# Patient Record
Sex: FEMALE | Race: WHITE
[De-identification: names, ages, dates, MRNs, and addresses within clinical notes are randomized per-mention and may not be internally consistent; named-entity substitution may affect disease eponyms.]

---

## 2018-06-28 ENCOUNTER — HOSPITAL ENCOUNTER (OUTPATIENT)
Dept: HOSPITAL 96 - M.CL | Age: 73
Setting detail: OBSERVATION
LOS: 1 days | Discharge: HOME | End: 2018-06-29
Attending: INTERNAL MEDICINE | Admitting: INTERNAL MEDICINE
Payer: COMMERCIAL

## 2018-06-28 VITALS — BODY MASS INDEX: 39.61 KG/M2 | WEIGHT: 232 LBS | HEIGHT: 64.02 IN

## 2018-06-28 VITALS — DIASTOLIC BLOOD PRESSURE: 64 MMHG | SYSTOLIC BLOOD PRESSURE: 156 MMHG

## 2018-06-28 VITALS — SYSTOLIC BLOOD PRESSURE: 146 MMHG | DIASTOLIC BLOOD PRESSURE: 59 MMHG

## 2018-06-28 VITALS — SYSTOLIC BLOOD PRESSURE: 154 MMHG | DIASTOLIC BLOOD PRESSURE: 62 MMHG

## 2018-06-28 VITALS — SYSTOLIC BLOOD PRESSURE: 160 MMHG | DIASTOLIC BLOOD PRESSURE: 70 MMHG

## 2018-06-28 VITALS — DIASTOLIC BLOOD PRESSURE: 45 MMHG | SYSTOLIC BLOOD PRESSURE: 138 MMHG

## 2018-06-28 VITALS — SYSTOLIC BLOOD PRESSURE: 164 MMHG | DIASTOLIC BLOOD PRESSURE: 73 MMHG

## 2018-06-28 VITALS — DIASTOLIC BLOOD PRESSURE: 73 MMHG | SYSTOLIC BLOOD PRESSURE: 164 MMHG

## 2018-06-28 VITALS — DIASTOLIC BLOOD PRESSURE: 60 MMHG | SYSTOLIC BLOOD PRESSURE: 139 MMHG

## 2018-06-28 VITALS — DIASTOLIC BLOOD PRESSURE: 58 MMHG | SYSTOLIC BLOOD PRESSURE: 148 MMHG

## 2018-06-28 VITALS — SYSTOLIC BLOOD PRESSURE: 143 MMHG | DIASTOLIC BLOOD PRESSURE: 52 MMHG

## 2018-06-28 VITALS — SYSTOLIC BLOOD PRESSURE: 158 MMHG | DIASTOLIC BLOOD PRESSURE: 73 MMHG

## 2018-06-28 VITALS — SYSTOLIC BLOOD PRESSURE: 146 MMHG | DIASTOLIC BLOOD PRESSURE: 65 MMHG

## 2018-06-28 VITALS — DIASTOLIC BLOOD PRESSURE: 77 MMHG | SYSTOLIC BLOOD PRESSURE: 168 MMHG

## 2018-06-28 VITALS — SYSTOLIC BLOOD PRESSURE: 143 MMHG | DIASTOLIC BLOOD PRESSURE: 65 MMHG

## 2018-06-28 VITALS — DIASTOLIC BLOOD PRESSURE: 56 MMHG | SYSTOLIC BLOOD PRESSURE: 140 MMHG

## 2018-06-28 DIAGNOSIS — Z98.890: ICD-10-CM

## 2018-06-28 DIAGNOSIS — Z90.89: ICD-10-CM

## 2018-06-28 DIAGNOSIS — I25.10: Primary | ICD-10-CM

## 2018-06-28 DIAGNOSIS — I35.1: ICD-10-CM

## 2018-06-28 DIAGNOSIS — I10: ICD-10-CM

## 2018-06-28 DIAGNOSIS — I05.0: ICD-10-CM

## 2018-06-28 DIAGNOSIS — I51.7: ICD-10-CM

## 2018-06-28 DIAGNOSIS — I35.0: ICD-10-CM

## 2018-06-28 DIAGNOSIS — E78.5: ICD-10-CM

## 2018-06-28 LAB
ALBUMIN SERPL-MCNC: 3.5 G/DL (ref 3.4–5)
ALP SERPL-CCNC: 63 U/L (ref 46–116)
ALT SERPL-CCNC: 46 U/L (ref 30–65)
ANION GAP SERPL CALC-SCNC: 9 MMOL/L (ref 7–16)
APTT BLD: 22.7 SECONDS (ref 25–31.3)
AST SERPL-CCNC: 28 U/L (ref 15–37)
BILIRUB SERPL-MCNC: 0.5 MG/DL
BUN SERPL-MCNC: 27 MG/DL (ref 7–18)
CALCIUM SERPL-MCNC: 9 MG/DL (ref 8.5–10.1)
CHLORIDE SERPL-SCNC: 107 MMOL/L (ref 98–107)
CHOLEST SERPL-MCNC: 206 MG/DL (ref ?–200)
CO2 SERPL-SCNC: 25 MMOL/L (ref 21–32)
CREAT SERPL-MCNC: 1.1 MG/DL (ref 0.6–1.3)
GLUCOSE SERPL-MCNC: 119 MG/DL (ref 70–99)
HCT VFR BLD CALC: 37.4 % (ref 37–47)
HDLC SERPL-MCNC: 42 MG/DL (ref 40–?)
HGB BLD-MCNC: 12.6 GM/DL (ref 12–15)
INR PPP: 1.1
LDLC SERPL-MCNC: 146 MG/DL (ref ?–100)
MCH RBC QN AUTO: 32.8 PG (ref 26–34)
MCHC RBC AUTO-ENTMCNC: 33.5 G/DL (ref 28–37)
MCV RBC: 97.9 FL (ref 80–100)
MPV: 8.2 FL. (ref 7.2–11.1)
PLATELET COUNT*: 222 THOU/UL (ref 150–400)
POTASSIUM SERPL-SCNC: 4.3 MMOL/L (ref 3.5–5.1)
PROT SERPL-MCNC: 6.9 G/DL (ref 6.4–8.2)
PROTHROMBIN TIME: 10.3 SECONDS (ref 9.2–11.5)
RBC # BLD AUTO: 3.83 MIL/UL (ref 4.2–5)
RDW-CV: 13.7 % (ref 10.5–14.5)
SODIUM SERPL-SCNC: 141 MMOL/L (ref 136–145)
TC:HDL: 4.9 RATIO
TRIGL SERPL-MCNC: 93 MG/DL (ref ?–150)
VLDLC SERPL CALC-MCNC: 19 MG/DL (ref ?–40)
WBC # BLD AUTO: 4.6 THOU/UL (ref 4–11)

## 2018-06-28 NOTE — EKG
[place] [address]
Phone:  [phone]                     ELECTROCARDIOGRAM REPORT      
_______________________________________________________________________________
 
Name:       NAIMA ARIZMENDI             Room:                      REG CLI 
M.R.#:  [id number]      Account #:      [id number]  
Admission:  18     Attend Phys:    Jules Carreno MD
Discharge:               Date of Birth:  [birth year]  
         Report #: [id number]
    [id number]
_______________________________________________________________________________
THIS REPORT FOR:  //name//                      
 
                          [facility]
                                       
Test Date:    2018               Test Time:    09:46:33
Pat Name:     NAIMA ARIZMENDI         Department:   
Patient ID:   [id number]            Room:          
Gender:       F                        Technician:   
:          1945               Requested By: Jules Carreno
Order Number: [id number]    Reading MD:   Jules Carreno
                                 Measurements
Intervals                              Axis          
Rate:         70                       P:            74
MD:           193                      QRS:          22
QRSD:         102                      T:            -19
QT:           409                                    
QTc:          442                                    
                           Interpretive Statements
Sinus rhythm
Low voltage, precordial leads
Borderline repolarization abnormality
No previous ECG available for comparison
 
Electronically Signed On 2018 11:33:09 CDT by Jules Carreno
[email or web address]
 
 
 
 
 
 
 
 
 
 
 
 
 
 
 
 
 
 
  <ELECTRONICALLY SIGNED>
                                           By: Jules Carreno MD, [facility]   
  18     1133
D: 1846   _____________________________________
T: 1846   Jules Carreno MD, [facility]     /EPI

## 2018-06-28 NOTE — NUR
PT UP FROM CATH LAB C/O PAIN ACHING RIGHT KNEE REPLACEMENT AND NEEDS THE OTHER
DONE IV CONT 125/H NS UP SBA SLIGHTLY UNSTEADY  AT BEDSIDE TREMORS TO
BILAT HANDS MORE ON THE RIGHT THAN LEFT CALL LIGHT IN REACH TRANSPARENT
DRESSING PUT ON RIGHT RADIAL SITE SMALL HEMATOMA PURPLE IN COLOR UNDERNEATH

## 2018-06-28 NOTE — CARD
[address]  [address]                    CARDIAC CATH REPORT           
_______________________________________________________________________________
 
Name:       NAIMA ARIZMENDI             Room:           [address] [place]  
[facility]#:  [id number]      Account #:      [id number]  
Admission:  06/28/18     Attend Phys:    Jules Carreno MD
Discharge:               Date of Birth:  [birth year]  
         Report #: [id number]
                                                                     [id number]
_______________________________________________________________________________
THIS REPORT FOR:  //name//                      
 
 
--------------- APPROVED REPORT --------------
 
 
Study performed:  06/28/2018 08:23:17
 
Patient Details
Patient Status: Out-Patient                  Room #: 222
The patient is a 73 year-old female
 
Event Personnel
Jules Carreno  Cardiologist, Emily Thompson RN Circulator, 
Roberth Sellers (R) Monitor, Naida Dixon  Scrub, Flory Cooper RTR Scrub, Rodolfo Gallo  Interventional 
Cardiologist
 
Procedures Performed
PTCA Single Vessel LAD
 
Indication
Dyspnea
 
Risk Factors
Hypercholesterolemia, Hypertension
 
Admission/Lab Medications/Medications given during procedure
Aspirin, Glycoprotein IllbIlla Inhibitors, Heparin 
Unfract.
 
Procedure Narrative
The patient was brought electively to the Cardiac Catheterization 
Laboratory and was prepped and draped in a sterile manner. The right 
wrist was infiltrated with 1% Lidocaine subcutaneous anesthesia. A 
Slender Glidesheath sheath was inserted into the right radial artery. 
Coronary angiography was performed using coronary diagnostic 
catheters. The right coronary system was accessed and visualized with 
a Diagnostic 3DRC 5fr catheter. The left coronary system was accessed 
and visualized with a Diagnostic DCR: Tiger 4.0 5fr catheter. The 
left ventricle was accessed and visualized with a Diagnostic PC: 
Angled Pig 5fr catheter. Left ventricular/Aortic Valve gradient 
assessed via catheter pullback. Left ventriculogram was performed in 
STYLES projection. Closure device was deployed with a 6 Fr Vasc-Band Lng 
27cm. The patient tolerated the procedure well and there were no 
complications associated with the procedure. There was no 
 
 
 
[place] [address]                    CARDIAC CATH REPORT           
_______________________________________________________________________________
 
Name:       NAIMA ARIZMENDI             Room:           [address] [place]  
..#:  [id number]      Account #:      [id number]  
Admission:  06/28/18     Attend Phys:    Jules Carreno MD
Discharge:               Date of Birth:  [birth year]  
         Report #: [id number]
                                                                     [id number]
_______________________________________________________________________________
hematoma.
 
Intraoperative Conscious Sedation
Sedation start time:  10:24           Case end Time:  
12:29    
Fentanyl  75 mcg    Versed  5 mg  
 
Fluoro Time:    27.0 minutes     
Dose:     DAP 504700 cGycm2  3997 mGy  
Contrast Type and Amount:  Omnipaque 345 ml    
 
Coronary Angiography
The patient's coronary anatomy is right dominant. 
 
Diagnostic Cath
Left Main normal
LAD  prox. 80-90%, heavily calicifed eccentric stenosis, mid 70% x 2, 
the distal portion involves ositium of diagonal #2, distal LAD mild 
disease,&lt;30%
Diagonal 1 large , normal
Diagonal 2 medium to large, normal
Circumflex mild proximal disease, mid body is normal , large vessel 
overall
OM1  ostial 40%
OM2  very tortuous, moderate distal disease 50% diffusely
Right Coronary occluded mid body
R PDA  larger vessel , seen with left to right collaterals
 
Left Ventriculography
The left ventricle is normal in size with normal contractility. The 
left ventricular ejection fraction is estimated to be 60-65%. severe 
mitral annular calcification mild aortic stenosis, peak to peak 
gradient &lt;15mmHg
 
Hemodynamics
The aortic pressure is 106/55 mmHg with a mean of 73 mmHg. The left 
ventricular pressure is 128/3 mmHg with a mean of mmHg. The left 
ventricular end diastolic pressure is 14 mmHg. Pullback from the left 
ventricle to the aorta revealed a 15 mm gradient across the aortic 
valve.
 
PCI Technique Lesion
Anticoagulation was achieved with Heparin. iv aggrastat given bolus 
only Patient was preloaded with Plavix. Percutaneous coronary 
intervention was performed on the mid left anterior descending artery 
segment. The lesion stenosis prior to intervention was 70%% with FERNANDO 
 
 
 
[place] [address]                    CARDIAC CATH REPORT           
_______________________________________________________________________________
 
Name:       NAIMA ARIZMENDI             Room:           [address] [place]  
[facility]#:  [id number]      Account #:      [id number]  
Admission:  06/28/18     Attend Phys:    Jules Carreno MD
Discharge:               Date of Birth:  [birth year]  
         Report #: [id number]
                                                                     [id number]
_______________________________________________________________________________
3 flow. A 6FR XB 3.5 100CM Guide Catheter was used to engage the lm 
ostium. A IG: BMW 190cm Interventional Guidewire was used to cross 
the lesion.
 
BALLOON DILATION
A Balloon catheter Trek RX 2.5 X 8 was inserted and inflated up to 
8atm for 18seconds. Repeat angiography revealed the following 
post-dilatation results: 0% stenosis.
 
Final angiography reveals 0 % stenosis with FERNANDO 3 flow.
 
PCI Technique Lesion 2
Percutaneous Coronary Intervention was performed on the left anterior 
descending artery segment. Patient was preloaded with Plavix. 
Percutaneous coronary intervention was performed on the mid lad. The 
lesion stenosis prior to intervention was 80% with FERNANDO 3 flow. A 6FR 
XB 3.5 100CM Guide Catheter was used to engage the lm ostium. A IG: 
BMW 190cm Interventional Guidewire was used to cross the 
lesion.
 
Balloon Dilation
A Balloon catheter Trek RX 2.5 X 8 was inserted and inflated up to 
16atm for 12seconds. Repeat angiography revealed the following 
post-dilatation results: 70% stenosis.
 
Final angiography reveals 70 % stenosis with FERNANDO 3 
flow.
 
Comments
Unable to advance bare metal stent to area of stenosis despite deep 
throat of guide, amaris wire, and predilatation.  Difficulty appeared 
to be secondary to tortuosity and calcification.  PTCA only 
performed.  If pt continues to have angina, consider femoral approach 
and guideliner catheter.
 
Conclusion
1. Severe disease involving  mid LAD
2. Occluded RCA , well collateralized, a dominant vessel
3. Mild Aortic stenosis
4. Known Moderate Mitral stenosis
5. Normal LV systolic function
6.  PTCA only of 2 lesions in lad, since a stent could not be 
advanced to area of stenosis 
 
Recommendations
Aggressive Medical Therapy
 
 
 
[place] [address]                    CARDIAC CATH REPORT           
_______________________________________________________________________________
 
Name:       TERRAANA PAULANAIMA SMITH             Room:           [address] [place]  
.R.#:  [id number]      Account #:      [id number]  
Admission:  06/28/18     Attend Phys:    Jules Carreno MD
Discharge:               Date of Birth:  [birth year]  
         Report #: [id number]
                                                                     [id number]
_______________________________________________________________________________
 
Diagnostic Cath Approved by: Jules Carreno MD        Date/Time:
 
 
 
 
 
 
 
 
 
 
 
 
 
 
 
 
 
 
 
 
 
 
 
 
 
 
 
 
 
 
 
 
 
 
 
 
 
 
 
 
 
 
<ELECTRONICALLY SIGNED>
                                        By:  Rodolfo Gallo MD, FACC      
06/28/18     1713
D: 06/28/18 1713_______________________________________
T: 06/28/18 1713Dcarlos alberto Gallo MD, FACC         /INF

## 2018-06-29 VITALS — SYSTOLIC BLOOD PRESSURE: 134 MMHG | DIASTOLIC BLOOD PRESSURE: 77 MMHG

## 2018-06-29 VITALS — SYSTOLIC BLOOD PRESSURE: 142 MMHG | DIASTOLIC BLOOD PRESSURE: 58 MMHG

## 2018-06-29 VITALS — SYSTOLIC BLOOD PRESSURE: 164 MMHG | DIASTOLIC BLOOD PRESSURE: 73 MMHG

## 2018-06-29 VITALS — DIASTOLIC BLOOD PRESSURE: 73 MMHG | SYSTOLIC BLOOD PRESSURE: 164 MMHG

## 2018-06-29 VITALS — DIASTOLIC BLOOD PRESSURE: 37 MMHG | SYSTOLIC BLOOD PRESSURE: 127 MMHG

## 2018-06-29 LAB
ANION GAP SERPL CALC-SCNC: 9 MMOL/L (ref 7–16)
BUN SERPL-MCNC: 23 MG/DL (ref 7–18)
CALCIUM SERPL-MCNC: 8.5 MG/DL (ref 8.5–10.1)
CHLORIDE SERPL-SCNC: 107 MMOL/L (ref 98–107)
CO2 SERPL-SCNC: 24 MMOL/L (ref 21–32)
CREAT SERPL-MCNC: 1.1 MG/DL (ref 0.6–1.3)
GLUCOSE SERPL-MCNC: 100 MG/DL (ref 70–99)
HCT VFR BLD CALC: 31.2 % (ref 37–47)
HGB BLD-MCNC: 10.5 GM/DL (ref 12–15)
MCH RBC QN AUTO: 32.8 PG (ref 26–34)
MCHC RBC AUTO-ENTMCNC: 33.7 G/DL (ref 28–37)
MCV RBC: 97.3 FL (ref 80–100)
MPV: 8.5 FL. (ref 7.2–11.1)
PLATELET COUNT*: 187 THOU/UL (ref 150–400)
POTASSIUM SERPL-SCNC: 4.3 MMOL/L (ref 3.5–5.1)
RBC # BLD AUTO: 3.21 MIL/UL (ref 4.2–5)
RDW-CV: 13.8 % (ref 10.5–14.5)
SODIUM SERPL-SCNC: 140 MMOL/L (ref 136–145)
WBC # BLD AUTO: 5 THOU/UL (ref 4–11)

## 2018-06-29 NOTE — EKG
[place] [address]
Phone:  [phone]                     ELECTROCARDIOGRAM REPORT      
_______________________________________________________________________________
 
Name:       NAIMA ARIZMENDI LUIS             Room:           [address]..#:  [id number]      Account #:      [id number]  
Admission:  18     Attend Phys:    Jules Carreno MD
Discharge:               Date of Birth:  [birth year]  
         Report #: [id number]
    [id number]
_______________________________________________________________________________
THIS REPORT FOR:  //name//                      
 
                          [facility]
                                       
Test Date:    2018               Test Time:    08:13:40
Pat Name:     NAIMA ARIZMENDI         Department:   
Patient ID:   [id number]            Room:         [address]
Gender:       F                        Technician:   
:          1945               Requested By: Jules Carreno
Order Number: [id number]    Reading MD:   Rodolfo Gallo
                                 Measurements
Intervals                              Axis          
Rate:         69                       P:            64
AK:           189                      QRS:          39
QRSD:         107                      T:            -21
QT:           416                                    
QTc:          446                                    
                           Interpretive Statements
Sinus rhythm
Low voltage, precordial leads
Borderline repolarization abnormality
Baseline wander in lead(s) II,aVF
Compared to ECG 2018 09:46:33
No significant changes
 
Electronically Signed On 2018 10:49:29 CDT by Rodolfo Gallo
[email or web address]
 
 
 
 
 
 
 
 
 
 
 
 
 
 
 
 
  <ELECTRONICALLY SIGNED>
                                           By: Rodolfo Gallo MD, [facility]      
  18     1049
D: 18   _____________________________________
T: 18   Rodolfo Gallo MD, [facility]        /EPI

## 2018-06-29 NOTE — EKG
[place] [address]
Phone:  [phone]                     ELECTROCARDIOGRAM REPORT      
_______________________________________________________________________________
 
Name:       NAIMA ARIZMENDI             Room:           [address]R.#:  [id number]      Account #:      [id number]  
Admission:  18     Attend Phys:    Jules Carreno MD
Discharge:               Date of Birth:  [birth year]  
         Report #: [id number]
    [id number]
_______________________________________________________________________________
THIS REPORT FOR:  //name//                      
 
                          [facility]
                                       
Test Date:    2018               Test Time:    13:51:13
Pat Name:     NAIMA ARIZMENDI         Department:   
Patient ID:   [id number]            Room:         [address]
Gender:       F                        Technician:   
:          1945               Requested By: Jules Carreno
Order Number: [id number]    Reading MD:   Rodolfo Gallo
                                 Measurements
Intervals                              Axis          
Rate:         63                       P:            57
ME:           196                      QRS:          41
QRSD:         102                      T:            -32
QT:           433                                    
QTc:          444                                    
                           Interpretive Statements
Sinus rhythm
Low voltage, precordial leads
Borderline repolarization abnormality
Compared to ECG 2018 09:46:33
No significant changes
 
Electronically Signed On 2018 10:42:25 CDT by Rodolfo Gallo
[email or web address]
 
 
 
 
 
 
 
 
 
 
 
 
 
 
 
 
 
  <ELECTRONICALLY SIGNED>
                                           By: Rodolfo Gallo MD, FACC      
  18     1042
D: 18 1351   _____________________________________
T: 18 1351   Rodolfo Gallo MD, [facility]        /EPI

## 2018-06-29 NOTE — NUR
ASSUMED CARE AROUND 1930. PT A/OX4, [place]. TELE MONITOR TRACING SR. VSS.
ONLY PAIN REPORTED WAS H/A LAST NIGHT, PRN APAP GIVEN. ON ROOM AIR. IV SALINE
LOCKED AT THIS TIME. RIGHT RADIAL SITE DRSG C/D/I, INSTRUCTED TO BE CAUTIOUS
WITH THAT HAND/WRIST. BRUISING NOTED AROUND, BUT SOFT. SEE CHARTING. CALL
LIGHT IN REACH, WILL CONTINUE WITH PLAN OF CARE.

## 2018-06-29 NOTE — NUR
ASSESSMENT COMPLETED REFER TO COMPUTER CHARTING. CARDIAC MONITOR TRACKING SR.
PATIENT REPORTING NO PAIN, NAUSEA OR SHORTNESS OF BREATH. BED IN LOW AND
LOCKED POSITION. CALL LIGHT WITHIN REACH. IV SALINE LOCKED AND ON ROOM AIR.
 
DISCHARGE ORDERS RECIEVED. DISCHARGE INSTRUCTIONS GIVEN TO PATIENT AND SPOUSE.
REPORTING NO QUESTIONS OR CONCERNS AT THIS TIME. IV AND CARDIAC MONITOR
DISCONTINUED AND REMOVED. ALL PERSONAL BELONGINGS SENT WITH PATIENT. PATIENT
TAKEN TO THE FRONT DOORS VIA WHEEL CHAIR BY NURSING STAFF.

## 2018-07-01 NOTE — D
[address]  [address]                    DISCHARGE SUMMARY             
_______________________________________________________________________________
 
Name:       NAIMA ARIZMENDI             Room:           [address] Juan Manuel VARGHESE#:  [id number]      Account #:      [id number]  
Admission:  06/28/18     Attend Phys:    Jules Carreno MD
Discharge:  06/29/18     Date of Birth:  [birth year]  
         Report #: [id number]
                                                                     [id number]  
_______________________________________________________________________________
THIS REPORT FOR:  //name//                      
 
CC: RAMAKRISHNA COKER DO
    Ramakrishna Carreno
 
DATE OF SERVICE:  06/29/2018
 
 
DISCHARGE DIAGNOSES:
1.  Coronary artery disease.
2.  Aortic stenosis.
3.  Mitral stenosis.
4.  Hypertension.
5.  Hyperlipidemia.
 
CONSULTANTS:  None.
 
PROCEDURES:  Left heart catheterization with percutaneous transluminal coronary
angioplasty of the left anterior descending artery via the radial approach.
 
HISTORY OF PRESENT ILLNESS:  The patient is a 73-year-old  white female
who was brought to the outpatient department to undergo a cardiac
catheterization.  The patient has no history of heart disease.  Recently, she
complained of exertional dyspnea and her ECG was noted to be abnormal.  She was
referred to my partner, Dr. Carreno.  Because of her risk factors, he
recommended a stress test.  She was also noted to have a heart murmur, so we
recommended echocardiogram.  The echocardiogram was done on 06/18/2018, it
showed a normal ejection fraction, left atrial enlargement, evidence of mild
aortic stenosis, moderate aortic insufficiency.  There is evidence of at least
mild mitral stenosis with left ventricular hypertrophy.  She also underwent a
Cardiolite stress test that showed an anterior defect at rest that worsened with
vasodilator stress.  This was consistent with ischemia.  He therefore
recommended a cardiac catheterization.  The patient denies history of chest
pain.  She does have occasional racing of her heart, but she has had no syncope.
 
PAST MEDICAL HISTORY:  Significant for previous cholecystectomy, hysterectomy,
knee replacement, tonsillectomy, hypertension, and hyperlipidemia.  She
complains that she hurts all over and was seen by rheumatologist who felt she
had fibromyalgia syndrome.
 
MEDICATIONS:  Her current medications include allopurinol, Flexeril, Lopid,
losartan, metoprolol, spironolactone, Desyrel.  In the past, she apparently was
on a statin drug, but it was discontinued because of her complaint of muscle
aches. 
 
 
 
 
[place] [address]                    DISCHARGE SUMMARY             
_______________________________________________________________________________
 
Name:       NAIMA ARIZMENDI             [address]           [address]  [id number]      Account #:      [id number]  
Admission:  06/28/18     Attend Phys:    Jules Carreno MD
Discharge:  06/29/18     Date of Birth:  [birth year]  
         Report #: [id number]
                                                                     [id number]  
_______________________________________________________________________________
ALLERGIES:  SHE ALSO HAS AN ALLERGY TO MORPHINE AND PENICILLIN.
 
PHYSICAL EXAMINATION:
GENERAL:  Revealed an elderly female.
VITAL SIGNS:  Blood pressure 130/80, pulse is 66.
CHEST:  Clear to auscultation.
CARDIOVASCULAR:  Regular rate and rhythm, grade 3 systolic ejection murmur.
ABDOMEN:  Soft.
EXTREMITIES:  No edema.
SKIN:  Warm and dry.
 
RADIOLOGICAL DATA:  Her ECG showed a sinus rhythm with nonspecific ST and T-wave
change.
 
LABORATORY DATA:  Revealed sodium of 140, BUN 23, creatinine 1.1.  Her
cholesterol was 206, triglyceride 93, HDL 42, .  White blood cell count
5.0 and hemoglobin 12.6.
 
HOSPITAL COURSE:  The patient was brought to the outpatient department.  Dr. Carreno performed cardiac catheterization from the right radial artery. 
Results showed severe coronary artery disease.  She had a chronic occlusion of
the right coronary artery that was filled by collaterals.  The left anterior
descending artery had what appeared to be a very eccentric 90% stenosis just
after the first diagonal branch.  Prior to the second diagonal branch, there was
a 70% tubular narrowing.  After the small second diagonal branch, there was
another 70% tubular stenosis.  The circumflex had no high-grade disease.  The
plan was to perform stenting of the LAD even though she had no history of
angina, but she did complain of exertional dyspnea and had an abnormal
Cardiolite.  The patient was given heparin and Aggrastat.  I then performed
balloon angioplasty of the 90% LAD after the first diagonal branch and the
second 70% stenosis.  Unfortunately, because of the calcification and
tortuosity, I was unable to place stents.  This was despite using a buddy wire
and deep throating of the guiding catheter.  The patient tolerated the procedure
well.  Despite efforts, it was decided to abandon further efforts at placing a
stent.  I attempted placing a bare metal stent.  Unfortunately, the patient
tolerated the procedure well.  She was loaded with Plavix.  ____ band was placed
over the right radial artery.  The following day, she was ambulating, had no
further complaints.  She had some ecchymosis of the right wrist, but there was
no significant hematoma.  She has had no chest pain, shortness of breath,
arrhythmias.  She was discharged to continue her home medications that include
allopurinol 100 mg a day, Flexeril 10 mg a day, Lopid 600 mg twice a day,
losartan 100 mg a day, metoprolol XL 25 mg a day, spironolactone 25 mg a day,
Desyrel 50 mg a day.  In addition, she was started on aspirin 81 mg a day and
Plavix 75 mg a day following balloon angioplasty.  To lower her LDL, she was
started on Lipitor 40 mg a day.  She was to contact my office if she had
increasing joint aches in her muscles.  She was given Plavix 75 mg a day to take
 
 
 
[place] [address]                    DISCHARGE SUMMARY             
_______________________________________________________________________________
 
Name:       NAIMA ARIZMENDI             Room:           [address] Juan Manuel VARGHESE#:  [id number]      Account #:      [id number]  
Admission:  06/28/18     Attend Phys:    Jules Carreno MD
Discharge:  06/29/18     Date of Birth:  [birth year]  
         Report #: [id number]
                                                                     [id number]  
_______________________________________________________________________________
orally following balloon angioplasty for coronary artery disease and she was
given nitroglycerin to take as needed for chest pain.  She was scheduled to
return to see my nurse practitioner in 1 week for inspection of her right wrist.
 She plans to see Dr. Carreno in 1 month.  Most likely, he will proceed with
MILENA to assess her mitral valve stenosis.  If she begins to have angina or
continues to have dyspnea, I would attempt stenting of the LAD, perhaps using
the femoral approach for additional support, atherectomy or perhaps a
Guideliner.  Her prognosis is guarded due to her diffuse coronary artery
disease.  She was discharged to return to the care of Dr. Bradley for routine
medical care.  I did recommend she enroll in cardiac rehabilitation.  At the
time of discharge, she was ambulating, had no further complaints.  Additional
lab work after her procedure included hemoglobin 10.5 and there was no drop in
hemoglobin or ECG changes.  At the time of discharge, she had a blood pressure
of 140/70 with pulse 60.
 
 
 
 
 
 
 
 
 
 
 
 
 
 
 
 
 
 
 
 
 
 
 
 
 
 
 
 
 
 
<ELECTRONICALLY SIGNED>
                                        By:  Rodolfo Gallo MD, FACC      
07/01/18     1819
D: 06/29/18 1129_______________________________________
T: 06/29/18 1222Davijuan Gallo MD, FACC         /nt

## 2018-07-19 ENCOUNTER — HOSPITAL ENCOUNTER (OUTPATIENT)
Dept: HOSPITAL 96 - M.CL | Age: 73
Discharge: HOME | End: 2018-07-19
Attending: INTERNAL MEDICINE
Payer: COMMERCIAL

## 2018-07-19 VITALS — SYSTOLIC BLOOD PRESSURE: 150 MMHG | DIASTOLIC BLOOD PRESSURE: 74 MMHG

## 2018-07-19 VITALS — DIASTOLIC BLOOD PRESSURE: 40 MMHG | SYSTOLIC BLOOD PRESSURE: 138 MMHG

## 2018-07-19 VITALS — SYSTOLIC BLOOD PRESSURE: 139 MMHG | DIASTOLIC BLOOD PRESSURE: 52 MMHG

## 2018-07-19 VITALS — DIASTOLIC BLOOD PRESSURE: 40 MMHG | SYSTOLIC BLOOD PRESSURE: 119 MMHG

## 2018-07-19 VITALS — SYSTOLIC BLOOD PRESSURE: 135 MMHG | DIASTOLIC BLOOD PRESSURE: 41 MMHG

## 2018-07-19 VITALS — DIASTOLIC BLOOD PRESSURE: 90 MMHG | SYSTOLIC BLOOD PRESSURE: 158 MMHG

## 2018-07-19 VITALS — DIASTOLIC BLOOD PRESSURE: 79 MMHG | SYSTOLIC BLOOD PRESSURE: 159 MMHG

## 2018-07-19 VITALS — DIASTOLIC BLOOD PRESSURE: 48 MMHG | SYSTOLIC BLOOD PRESSURE: 139 MMHG

## 2018-07-19 VITALS — DIASTOLIC BLOOD PRESSURE: 43 MMHG | SYSTOLIC BLOOD PRESSURE: 112 MMHG

## 2018-07-19 VITALS — SYSTOLIC BLOOD PRESSURE: 173 MMHG | DIASTOLIC BLOOD PRESSURE: 84 MMHG

## 2018-07-19 VITALS — SYSTOLIC BLOOD PRESSURE: 167 MMHG | DIASTOLIC BLOOD PRESSURE: 80 MMHG

## 2018-07-19 VITALS — DIASTOLIC BLOOD PRESSURE: 37 MMHG | SYSTOLIC BLOOD PRESSURE: 160 MMHG

## 2018-07-19 VITALS — DIASTOLIC BLOOD PRESSURE: 50 MMHG | SYSTOLIC BLOOD PRESSURE: 122 MMHG

## 2018-07-19 DIAGNOSIS — Z79.82: ICD-10-CM

## 2018-07-19 DIAGNOSIS — Z79.01: ICD-10-CM

## 2018-07-19 DIAGNOSIS — Z88.0: ICD-10-CM

## 2018-07-19 DIAGNOSIS — I10: ICD-10-CM

## 2018-07-19 DIAGNOSIS — Z79.899: ICD-10-CM

## 2018-07-19 DIAGNOSIS — I08.0: Primary | ICD-10-CM

## 2018-07-19 DIAGNOSIS — Z88.8: ICD-10-CM

## 2018-07-19 NOTE — TEE
[place] [address]
Phone:  [phone]                     TRANSESOPHAGEAL ECHOCARDIOGRAM
_______________________________________________________________________________
 
Name:         KYLEIGHNAIMA SMITH            Room:                     REG CLI
M.R.#:    [id number]     Account #:     [id number]  
Admission:    07/19/18    Attend Phys:   Jules Carreno,
Discharge:                Date of Birth: [birth year]  
Date of Service: 07/19/18 1336  Report #:      [id number]
        [id number]
_______________________________________________________________________________
THIS REPORT FOR:  //name//                      
 
 
--------------- APPROVED REPORT --------------
 
 
Study performed:  07/19/2018 08:47:26
 
EXAM: Transesophageal Echocardiogram 
Patient Location: Out-Patient  
     Status:  routine
 
      BSA:         2.13
HR: 78 bpm BP:          173/84 mmHg 
Rhythm: NSR     
 
Other Information 
Study Quality: Good
 
Indications
Mitral Valve Disease 
 
Echo Enhancing Agent
Indication: Rule out Shunt
Agent(s) / Amount(s) Used: Agitated Saline 10 cc
 
Procedure
After obtaining informed consent, patient underwent transesophageal 
echo in the [facility]. 
Type of Sedation : Conscious Sedation
Sedation was administered by Dorota Alex RN. 
Sedation start time:  0936           Case end Time:  0949
Sedation was achieved intravenously with:  Versed (4)    Fentanyl 
(50)    
Transesophageal probe was inserted and advanced into esophagus 
without difficulty by Jules Carreno MD, FACC.
Echo enhancement indication: R/O Septal defect.
Echo enhancement agent administered: Agitated Saline
The MILENA was performed without complications. 
Throughout the procedure, the blood pressure, pulse oximetry, cardiac 
rhythm, and rate were monitored.
The patient tolerated the procedure without adverse effects. Recovery 
from conscious sedation was uneventful and vital signs were 
stable.
 
Left Ventricle
 
 
[place] [address]
Phone:  [phone]                     TRANSESOPHAGEAL ECHOCARDIOGRAM
_______________________________________________________________________________
 
Name:         NAIMA ARIZMENDI            Room:                     REG CLI
M.R.#:    [id number]     Account #:     [id number]  
Admission:    07/19/18    Attend Phys:   Jules Carreno,
Discharge:                Date of Birth: [birth year]  
Date of Service: 07/19/18 1336  Report #:      [id number]
        [id number]
_______________________________________________________________________________
The left ventricle is normal size. There is normal LV segmental wall 
motion. Mild to moderate concentric left ventricular hypertrophy. 
There is no ventricular septal defect visualized. Left ventricular 
systolic function is normal. The left ventricular ejection fraction 
is within the normal range. No left ventricle thrombus noted on this 
study. LVEF is 65%.
 
Right Ventricle
The right ventricle is normal size. The right ventricular systolic 
function is normal.
 
Atria
Left atrium is moderately dilated. No thrombus is visualized in the 
left atrium or appendage. Interatrial septum is intact without 
evidence of ASD or PFO. The right atrium size is normal.
 
Aortic Valve
Mild aortic valve sclerosis. Mild aortic regurgitation. Mild aortic 
stenosis.Planimeter area 1.86cm2
 
Mitral Valve
Severe calcification of annulus, thickened leaflets. The posterior 
leaflet is redundant. Mild to Moderate mitral regurgitation. Moderate 
mitral stenosis.Mean gradient 8.4mmhg
 
Tricuspid Valve
The tricuspid valve is normal in structure. Moderate tricuspid 
regurgitation.
 
Pulmonic Valve
The pulmonary valve is normal in structure. Trace pulmonic 
regurgitation.
 
Great Vessels
The aortic root is normal in size.
 
Pericardium
There is no pericardial effusion.
 
&lt;Conclusion&gt;
LVEF is 65%.
Mild aortic stenosis.
Mild aortic regurgitation.
Moderate mitral stenosis.Mean gradient 8.4mmhg
Mild to Moderate mitral regurgitation.
There is normal LV segmental wall motion.
 
 
[place] [address]
Phone:  [phone]                     TRANSESOPHAGEAL ECHOCARDIOGRAM
_______________________________________________________________________________
 
Name:         NAIMA ARIZMENDI            Room:                     REG CLI
M.R.#:    [id number]     Account #:     [id number]  
Admission:    07/19/18    Attend Phys:   Jules Carreno,
Discharge:                Date of Birth: [birth year]  
Date of Service: 07/19/18 1336  Report #:      [id number]
        [id number]
_______________________________________________________________________________
Left atrium is moderately dilated.
No thrombus is visualized in the left atrium or 
appendage.
Interatrial septum is intact without evidence of ASD or PFO.
 
 
 
 
 
 
 
 
 
 
 
 
 
 
 
 
 
 
 
 
 
 
 
 
 
 
 
 
 
 
 
 
 
 
 
 
 
 
 
 
  <ELECTRONICALLY SIGNED>
                                           By: Jules Carreno MD, [facility]   
  07/19/18 1336
D: 07/19/18 1336   _____________________________________
T: 07/19/18 1336   Jules Carreno MD, FACC     /INF

## 2018-07-25 ENCOUNTER — HOSPITAL ENCOUNTER (INPATIENT)
Dept: HOSPITAL 96 - M.CL | Age: 73
LOS: 2 days | Discharge: HOME | DRG: 246 | End: 2018-07-27
Attending: INTERNAL MEDICINE | Admitting: INTERNAL MEDICINE
Payer: COMMERCIAL

## 2018-07-25 VITALS — DIASTOLIC BLOOD PRESSURE: 36 MMHG | SYSTOLIC BLOOD PRESSURE: 87 MMHG

## 2018-07-25 VITALS — DIASTOLIC BLOOD PRESSURE: 54 MMHG | SYSTOLIC BLOOD PRESSURE: 139 MMHG

## 2018-07-25 VITALS — SYSTOLIC BLOOD PRESSURE: 92 MMHG | DIASTOLIC BLOOD PRESSURE: 46 MMHG

## 2018-07-25 VITALS — SYSTOLIC BLOOD PRESSURE: 114 MMHG | DIASTOLIC BLOOD PRESSURE: 40 MMHG

## 2018-07-25 VITALS — SYSTOLIC BLOOD PRESSURE: 99 MMHG | DIASTOLIC BLOOD PRESSURE: 40 MMHG

## 2018-07-25 VITALS — SYSTOLIC BLOOD PRESSURE: 89 MMHG | DIASTOLIC BLOOD PRESSURE: 36 MMHG

## 2018-07-25 VITALS — SYSTOLIC BLOOD PRESSURE: 131 MMHG | DIASTOLIC BLOOD PRESSURE: 47 MMHG

## 2018-07-25 VITALS — SYSTOLIC BLOOD PRESSURE: 97 MMHG | DIASTOLIC BLOOD PRESSURE: 37 MMHG

## 2018-07-25 VITALS — SYSTOLIC BLOOD PRESSURE: 115 MMHG | DIASTOLIC BLOOD PRESSURE: 45 MMHG

## 2018-07-25 VITALS — DIASTOLIC BLOOD PRESSURE: 37 MMHG | SYSTOLIC BLOOD PRESSURE: 105 MMHG

## 2018-07-25 VITALS — SYSTOLIC BLOOD PRESSURE: 132 MMHG | DIASTOLIC BLOOD PRESSURE: 54 MMHG

## 2018-07-25 VITALS — DIASTOLIC BLOOD PRESSURE: 48 MMHG | SYSTOLIC BLOOD PRESSURE: 119 MMHG

## 2018-07-25 VITALS — SYSTOLIC BLOOD PRESSURE: 94 MMHG | DIASTOLIC BLOOD PRESSURE: 40 MMHG

## 2018-07-25 VITALS — DIASTOLIC BLOOD PRESSURE: 54 MMHG | SYSTOLIC BLOOD PRESSURE: 112 MMHG

## 2018-07-25 VITALS — SYSTOLIC BLOOD PRESSURE: 119 MMHG | DIASTOLIC BLOOD PRESSURE: 48 MMHG

## 2018-07-25 VITALS — SYSTOLIC BLOOD PRESSURE: 99 MMHG | DIASTOLIC BLOOD PRESSURE: 42 MMHG

## 2018-07-25 VITALS — SYSTOLIC BLOOD PRESSURE: 91 MMHG | DIASTOLIC BLOOD PRESSURE: 45 MMHG

## 2018-07-25 VITALS — WEIGHT: 243 LBS | HEIGHT: 65 IN | BODY MASS INDEX: 40.48 KG/M2

## 2018-07-25 VITALS — SYSTOLIC BLOOD PRESSURE: 112 MMHG | DIASTOLIC BLOOD PRESSURE: 48 MMHG

## 2018-07-25 VITALS — SYSTOLIC BLOOD PRESSURE: 97 MMHG | DIASTOLIC BLOOD PRESSURE: 39 MMHG

## 2018-07-25 VITALS — DIASTOLIC BLOOD PRESSURE: 44 MMHG | SYSTOLIC BLOOD PRESSURE: 100 MMHG

## 2018-07-25 VITALS — DIASTOLIC BLOOD PRESSURE: 43 MMHG | SYSTOLIC BLOOD PRESSURE: 102 MMHG

## 2018-07-25 VITALS — DIASTOLIC BLOOD PRESSURE: 44 MMHG | SYSTOLIC BLOOD PRESSURE: 99 MMHG

## 2018-07-25 VITALS — DIASTOLIC BLOOD PRESSURE: 55 MMHG | SYSTOLIC BLOOD PRESSURE: 130 MMHG

## 2018-07-25 VITALS — DIASTOLIC BLOOD PRESSURE: 36 MMHG | SYSTOLIC BLOOD PRESSURE: 94 MMHG

## 2018-07-25 VITALS — DIASTOLIC BLOOD PRESSURE: 38 MMHG | SYSTOLIC BLOOD PRESSURE: 92 MMHG

## 2018-07-25 VITALS — SYSTOLIC BLOOD PRESSURE: 100 MMHG | DIASTOLIC BLOOD PRESSURE: 46 MMHG

## 2018-07-25 VITALS — DIASTOLIC BLOOD PRESSURE: 45 MMHG | SYSTOLIC BLOOD PRESSURE: 91 MMHG

## 2018-07-25 VITALS — SYSTOLIC BLOOD PRESSURE: 85 MMHG | DIASTOLIC BLOOD PRESSURE: 39 MMHG

## 2018-07-25 VITALS — SYSTOLIC BLOOD PRESSURE: 103 MMHG | DIASTOLIC BLOOD PRESSURE: 42 MMHG

## 2018-07-25 DIAGNOSIS — I50.33: ICD-10-CM

## 2018-07-25 DIAGNOSIS — E78.5: ICD-10-CM

## 2018-07-25 DIAGNOSIS — I25.119: Primary | ICD-10-CM

## 2018-07-25 DIAGNOSIS — I35.1: ICD-10-CM

## 2018-07-25 DIAGNOSIS — I10: ICD-10-CM

## 2018-07-25 LAB
ALBUMIN SERPL-MCNC: 3.8 G/DL (ref 3.4–5)
ALP SERPL-CCNC: 73 U/L (ref 46–116)
ALT SERPL-CCNC: 38 U/L (ref 30–65)
ANION GAP SERPL CALC-SCNC: 12 MMOL/L (ref 7–16)
APTT BLD: 24.3 SECONDS (ref 25–31.3)
AST SERPL-CCNC: 27 U/L (ref 15–37)
BILIRUB SERPL-MCNC: 0.4 MG/DL
BUN SERPL-MCNC: 27 MG/DL (ref 7–18)
CALCIUM SERPL-MCNC: 9.5 MG/DL (ref 8.5–10.1)
CHLORIDE SERPL-SCNC: 103 MMOL/L (ref 98–107)
CO2 SERPL-SCNC: 23 MMOL/L (ref 21–32)
CREAT SERPL-MCNC: 1.2 MG/DL (ref 0.6–1.3)
GLUCOSE SERPL-MCNC: 112 MG/DL (ref 70–99)
HCT VFR BLD CALC: 37.9 % (ref 37–47)
HGB BLD-MCNC: 12.8 GM/DL (ref 12–15)
INR PPP: 1.1
MCH RBC QN AUTO: 32.8 PG (ref 26–34)
MCHC RBC AUTO-ENTMCNC: 33.8 G/DL (ref 28–37)
MCV RBC: 97 FL (ref 80–100)
MPV: 8.1 FL. (ref 7.2–11.1)
PLATELET COUNT*: 227 THOU/UL (ref 150–400)
POTASSIUM SERPL-SCNC: 4.2 MMOL/L (ref 3.5–5.1)
PROT SERPL-MCNC: 7.6 G/DL (ref 6.4–8.2)
PROTHROMBIN TIME: 10.5 SECONDS (ref 9.2–11.5)
RBC # BLD AUTO: 3.91 MIL/UL (ref 4.2–5)
RDW-CV: 13.5 % (ref 10.5–14.5)
SODIUM SERPL-SCNC: 138 MMOL/L (ref 136–145)
WBC # BLD AUTO: 5.1 THOU/UL (ref 4–11)

## 2018-07-25 NOTE — H
[address]  [address]                    HISTORY AND PHYSICAL          
_______________________________________________________________________________
 
Name:       NAIMA ARIZMENDI             Room:           M.201-P    DIS IN  
M..#:  [id number]      Account #:      [id number]  
Admission:  07/25/18     Attend Phys:    Ivan Soto MD, 
Discharge:  07/27/18     Date of Birth:  [birth year]  
         Report #: [id number]
                                                                                
_______________________________________________________________________________
THIS REPORT FOR:  //name//                      
 
Please refer to the History and Physical performed in the physician's office.
 
 
 
 
 
 
 
 
 
 
 
 
 
 
 
 
 
 
 
 
 
 
 
 
 
 
 
 
 
 
 
 
 
 
 
 
 
 
 
 
 
                       
                                        By:                                
                 
D: 07/25/18     _______________________________________
T: 07/31/18 0858Medical Records Staff WILEY       /AL

## 2018-07-25 NOTE — NUR
RECEIVED REPORT FROM CHELITA IN CATH LAB AND ASSUMED CARE OF PT @ 0312.PT IS
A/O,VSS,TRACING SR ON THE MONITOR.LUNG SOUNDS ARE CLEAR.LAST BM WAS
YESTERDAY.IV LEFT AC PATENT WITH NS RUNNING @ 125ML/HR.PT IS CALM AND
COOPERATIVE WITH NO C/O PAIN AT TIME OF ASSESSMENT.PT IS BEDREST UNTIL 2138
PER POST CATH ORDERS.PT LEFT RESTING IN BED WIHT FAMILY AT BEDSIDE.CALL LIGHT
AND FALL PRECAUTIONS IN PLACE.POST CATH VITALS COMPLETED.CATH SITE CLEAN,DRY
AND INTACT.PT INFORMED OF PLAN OF CARE AND COMMUNICATES UNDERSTANDING.HOURLY
ROUNDING COMPLETED FOR PT SAFETY.WILL CONTINUE TO MONITOR FOR DURATION OF
SHIFT.

## 2018-07-25 NOTE — EKG
[place] [address]
Phone:  [phone]                     ELECTROCARDIOGRAM REPORT      
_______________________________________________________________________________
 
Name:       NAIMA ARIZMENDI             Room:           [address] [place]  
[facility].#:  [id number]      Account #:      [id number]  
Admission:  18     Attend Phys:    Ivan Soto MD, 
Discharge:               Date of Birth:  [birth year]  
         Report #: [id number]
    [id number]
_______________________________________________________________________________
THIS REPORT FOR:  //name//                      
 
                          [facility]
                                       
Test Date:    2018               Test Time:    13:33:31
Pat Name:     NAIMA ARIZMENDI         Department:   
Patient ID:   [id number]            Room:          
Gender:       F                        Technician:   
:          1945               Requested By: Ivan Soto
Order Number: [id number]    Reading MD:   Khanh Chao
                                 Measurements
Intervals                              Axis          
Rate:         73                       P:            72
TX:           194                      QRS:          42
QRSD:         101                      T:            -27
QT:           400                                    
QTc:          441                                    
                           Interpretive Statements
Sinus rhythm
Atrial premature complex
Inferior infarct, age indeterminate, possible
 
Compared to ECG 2018 08:13:40
Atrial premature complex(es) now present
Myocardial infarct finding now present
 
Electronically Signed On 2018 18:06:25 CDT by Khanh Chao
[email or web address]
 
 
 
 
 
 
 
 
 
 
 
 
 
 
 
  <ELECTRONICALLY SIGNED>
                                           By: Khanh Chao MD, FACC   
  18     1806
D: 18 1333   _____________________________________
T: 18 1333   Khanh Chao MD, [facility]     /EPI

## 2018-07-25 NOTE — EKG
[place] [address]
Phone:  [phone]                     ELECTROCARDIOGRAM REPORT      
_______________________________________________________________________________
 
Name:       NAIMA ARIZMENDI             Room:           [address] [place]  
.R.#:  [id number]      Account #:      [id number]  
Admission:  18     Attend Phys:    Ivan Soto MD, 
Discharge:               Date of Birth:  [birth year]  
         Report #: [id number]
    [id number]
_______________________________________________________________________________
THIS REPORT FOR:  //name//                      
 
                          [facility]
                                       
Test Date:    2018               Test Time:    16:18:18
Pat Name:     NAIMA ARIZMENDI         Department:   
Patient ID:   [id number]            Room:          
Gender:       F                        Technician:   [facility]
:          1945               Requested By: Ivan Soto
Order Number: [id number]    Reading MD:   Khanh Chao
                                 Measurements
Intervals                              Axis          
Rate:         82                       P:            46
AL:           202                      QRS:          41
QRSD:         100                      T:            -31
QT:           398                                    
QTc:          465                                    
                           Interpretive Statements
Sinus rhythm
Atrial premature complexes
nonspecific ST-T wave changes, consider ischemia
Borderline low voltage, extremity leads
Borderline repolarization abnormality
Compared to ECG 2018 08:13:40
Atrial premature complex(es) now present
 
Electronically Signed On 2018 18:07:11 CDT by Khanh Chao
[email or web address]
 
 
 
 
 
 
 
 
 
 
 
 
 
 
 
  <ELECTRONICALLY SIGNED>
                                           By: Khanh Chao MD, FACC   
  18     1807
D: 18 1618   _____________________________________
T: 18 1618   Khanh Chao MD, [facility]     /EPI

## 2018-07-26 VITALS — SYSTOLIC BLOOD PRESSURE: 93 MMHG | DIASTOLIC BLOOD PRESSURE: 36 MMHG

## 2018-07-26 VITALS — DIASTOLIC BLOOD PRESSURE: 34 MMHG | SYSTOLIC BLOOD PRESSURE: 95 MMHG

## 2018-07-26 VITALS — DIASTOLIC BLOOD PRESSURE: 35 MMHG | SYSTOLIC BLOOD PRESSURE: 71 MMHG

## 2018-07-26 VITALS — SYSTOLIC BLOOD PRESSURE: 86 MMHG | DIASTOLIC BLOOD PRESSURE: 36 MMHG

## 2018-07-26 VITALS — DIASTOLIC BLOOD PRESSURE: 50 MMHG | SYSTOLIC BLOOD PRESSURE: 99 MMHG

## 2018-07-26 VITALS — DIASTOLIC BLOOD PRESSURE: 45 MMHG | SYSTOLIC BLOOD PRESSURE: 122 MMHG

## 2018-07-26 VITALS — SYSTOLIC BLOOD PRESSURE: 114 MMHG | DIASTOLIC BLOOD PRESSURE: 52 MMHG

## 2018-07-26 VITALS — DIASTOLIC BLOOD PRESSURE: 38 MMHG | SYSTOLIC BLOOD PRESSURE: 92 MMHG

## 2018-07-26 VITALS — DIASTOLIC BLOOD PRESSURE: 35 MMHG | SYSTOLIC BLOOD PRESSURE: 99 MMHG

## 2018-07-26 VITALS — DIASTOLIC BLOOD PRESSURE: 37 MMHG | SYSTOLIC BLOOD PRESSURE: 82 MMHG

## 2018-07-26 VITALS — SYSTOLIC BLOOD PRESSURE: 117 MMHG | DIASTOLIC BLOOD PRESSURE: 49 MMHG

## 2018-07-26 VITALS — SYSTOLIC BLOOD PRESSURE: 96 MMHG | DIASTOLIC BLOOD PRESSURE: 39 MMHG

## 2018-07-26 VITALS — SYSTOLIC BLOOD PRESSURE: 84 MMHG | DIASTOLIC BLOOD PRESSURE: 36 MMHG

## 2018-07-26 VITALS — DIASTOLIC BLOOD PRESSURE: 47 MMHG | SYSTOLIC BLOOD PRESSURE: 116 MMHG

## 2018-07-26 VITALS — DIASTOLIC BLOOD PRESSURE: 19 MMHG | SYSTOLIC BLOOD PRESSURE: 75 MMHG

## 2018-07-26 VITALS — DIASTOLIC BLOOD PRESSURE: 39 MMHG | SYSTOLIC BLOOD PRESSURE: 107 MMHG

## 2018-07-26 VITALS — DIASTOLIC BLOOD PRESSURE: 40 MMHG | SYSTOLIC BLOOD PRESSURE: 91 MMHG

## 2018-07-26 VITALS — SYSTOLIC BLOOD PRESSURE: 80 MMHG | DIASTOLIC BLOOD PRESSURE: 36 MMHG

## 2018-07-26 VITALS — DIASTOLIC BLOOD PRESSURE: 41 MMHG | SYSTOLIC BLOOD PRESSURE: 96 MMHG

## 2018-07-26 VITALS — SYSTOLIC BLOOD PRESSURE: 128 MMHG | DIASTOLIC BLOOD PRESSURE: 47 MMHG

## 2018-07-26 VITALS — DIASTOLIC BLOOD PRESSURE: 41 MMHG | SYSTOLIC BLOOD PRESSURE: 112 MMHG

## 2018-07-26 VITALS — DIASTOLIC BLOOD PRESSURE: 44 MMHG | SYSTOLIC BLOOD PRESSURE: 102 MMHG

## 2018-07-26 VITALS — SYSTOLIC BLOOD PRESSURE: 120 MMHG | DIASTOLIC BLOOD PRESSURE: 50 MMHG

## 2018-07-26 VITALS — SYSTOLIC BLOOD PRESSURE: 116 MMHG | DIASTOLIC BLOOD PRESSURE: 47 MMHG

## 2018-07-26 VITALS — DIASTOLIC BLOOD PRESSURE: 34 MMHG | SYSTOLIC BLOOD PRESSURE: 86 MMHG

## 2018-07-26 VITALS — DIASTOLIC BLOOD PRESSURE: 54 MMHG | SYSTOLIC BLOOD PRESSURE: 131 MMHG

## 2018-07-26 VITALS — DIASTOLIC BLOOD PRESSURE: 46 MMHG | SYSTOLIC BLOOD PRESSURE: 121 MMHG

## 2018-07-26 VITALS — SYSTOLIC BLOOD PRESSURE: 122 MMHG | DIASTOLIC BLOOD PRESSURE: 51 MMHG

## 2018-07-26 VITALS — SYSTOLIC BLOOD PRESSURE: 107 MMHG | DIASTOLIC BLOOD PRESSURE: 42 MMHG

## 2018-07-26 VITALS — SYSTOLIC BLOOD PRESSURE: 98 MMHG | DIASTOLIC BLOOD PRESSURE: 40 MMHG

## 2018-07-26 LAB
ALBUMIN SERPL-MCNC: 2.7 G/DL (ref 3.4–5)
ALP SERPL-CCNC: 49 U/L (ref 46–116)
ALT SERPL-CCNC: 26 U/L (ref 30–65)
ANION GAP SERPL CALC-SCNC: 9 MMOL/L (ref 7–16)
AST SERPL-CCNC: 25 U/L (ref 15–37)
BILIRUB SERPL-MCNC: 0.5 MG/DL
BUN SERPL-MCNC: 21 MG/DL (ref 7–18)
CALCIUM SERPL-MCNC: 7.6 MG/DL (ref 8.5–10.1)
CHLORIDE SERPL-SCNC: 110 MMOL/L (ref 98–107)
CHOLEST SERPL-MCNC: 97 MG/DL (ref ?–200)
CO2 SERPL-SCNC: 22 MMOL/L (ref 21–32)
CREAT SERPL-MCNC: 1 MG/DL (ref 0.6–1.3)
GLUCOSE SERPL-MCNC: 135 MG/DL (ref 70–99)
HCT VFR BLD CALC: 27 % (ref 37–47)
HDLC SERPL-MCNC: 33 MG/DL (ref 40–?)
HGB BLD-MCNC: 9.3 GM/DL (ref 12–15)
LDLC SERPL-MCNC: 36 MG/DL (ref ?–100)
MCH RBC QN AUTO: 33.6 PG (ref 26–34)
MCHC RBC AUTO-ENTMCNC: 34.4 G/DL (ref 28–37)
MCV RBC: 97.7 FL (ref 80–100)
MPV: 8.1 FL. (ref 7.2–11.1)
PLATELET COUNT*: 176 THOU/UL (ref 150–400)
POTASSIUM SERPL-SCNC: 4.2 MMOL/L (ref 3.5–5.1)
PROT SERPL-MCNC: 5.2 G/DL (ref 6.4–8.2)
RBC # BLD AUTO: 2.77 MIL/UL (ref 4.2–5)
RDW-CV: 13.5 % (ref 10.5–14.5)
SERUM ASSESSMENT: CLEAR
SODIUM SERPL-SCNC: 141 MMOL/L (ref 136–145)
TC:HDL: 2.9 RATIO
TRIGL SERPL-MCNC: 140 MG/DL (ref ?–150)
TROPONIN-I LEVEL: 1.89 NG/ML (ref ?–0.06)
VLDLC SERPL CALC-MCNC: 28 MG/DL (ref ?–40)
WBC # BLD AUTO: 6.9 THOU/UL (ref 4–11)

## 2018-07-26 NOTE — NUR
TRANSFERRED TO ICU BED 5 AT 1910, DR BECKER AT BEDSIDE.  R GROIN CATH SITE
NOTED TO BRUISED WITH SMALL HEMATOMA LATERAL TO INSERTION SITE, DR BECKER
STATED IT WAS SOMEWHAT SMALLER THAN IT HAD BEEN PRIOR TO TRANSFER TO ICU.  BP
SOFT, 2L NS GIVEN WO AS ORDERED. DR BECKER CALLED TO CHECK NO PT AT 2045, BP
STILL SOFT BUT MAP >60.  PER DR BECKER, NS INCREASED TO 250ML/HR,
IMMOBILIZATION TO EXTEND UNTIL MORNING, BRAGA CATHETER PLACED.  HE STATED THAT
IF BP CONTINUED TO TREND DOWNWARD, MAY START DOPAMINE AT 5MCG/KG/MIN.  AT 0300
PTS MAP <60 X 3 CONSECUTIVE READINGS, DOPAMINE INITIATED PER ORDER.  SHORTLY
AFTER DOPAMINE STARTED PT BECAME NAUSEATED AND REPORTED FEELING HOT.  MAP
DROPPED TO <50.  CALL PLACED TO DR BECKER, PER HIS ORDER DOPAMINE DROPPED TO
2.5MCG/KG/MIN, IVF TO REMAIN AT 250ML/HR, AND CLOSE MONITORING OF URINE OUTPUT
TO DETERMINE ADEQUACY OF PERFUSION.  AT THAT TIME BRAGA WAS EMPTIED, ONE HOUR
LATER 50ML EMPTIED FROM BRAGA.  BP NOW TRENDING UPWARD SLOWLY.  PT HAS DENIED
CHEST PAIN AND SOA THROUGHOUT THE NIGHT.  CALL LIGHT WITHIN REACH.

## 2018-07-26 NOTE — 2DMMODE
[place] [address]
Phone:  [phone] 2 D/M-MODE ECHOCARDIOGRAM     
_______________________________________________________________________________
 
Name:         NAIMA ARIZMENDI            Room:          [facility]    [facility] [place] 
[facility]#:    [id number]     Account #:     [id number]  
Admission:    18    Attend Phys:   LUIS Choudhary
Discharge:                Date of Birth: [birth year]  
Date of Service: 18 1509  Report #:      [id number]
        [id number]
_______________________________________________________________________________
THIS REPORT FOR:  //name//                      
 
 
--------------- APPROVED REPORT --------------
 
 
Study performed:  2018 08:55:46
 
EXAM: Comprehensive 2D, Doppler, and color-flow 
Echocardiogram 
Patient Location: In-Patient   
Room #:  [facility]     Status:  routine
 
      BSA:         2.15
HR: 90 bpm BP:          120/50 mmHg 
Rhythm: NSR     
 
Other Information 
Study Quality: Good
 
Indications
Hypotension
CAD
Chest Pain
 
2D Dimensions
   LVEF(%):  72.65 (&gt;50%)
IVSd:  12.90 (7-11mm) LVOT Diam:  18.99 (18-24mm) 
LVDd:  47.13 mm  
PWd:  11.03 (7-11mm) 
LVDs:  27.46 (25-40mm) 
   Obrien's LVEF:  72.65 %
 
Volumes
Left Atrial Volume (Systole) 
    LA ESV Index:  38.40 mL/m2
 
Aortic Valve
AoV Peak Jos.:  2.70 m/s 
AO Peak Gr.:  29.15 mmHg LVOT Max P.01 mmHg
AO Mean Gr.:  17.08 mmHg LVOT Mean PG:  3.14 mmHg
    LVOT Max V:  1.23 m/s
AO V2 VTI:  53.41 cm  LVOT Mean V:  0.83 m/s
DURGA (VTI):  1.41 cm2  LVOT V1 VTI:  26.64 cm
AI Morrow:  2.26 m/s2  
AI PHT:  426.35 ms  
 
 
[place] [address]
Phone:  [phone]                     2 D/M-MODE ECHOCARDIOGRAM     
_______________________________________________________________________________
 
Name:         NAIMA ARIZMENDI            Room:          [address]    [place] 
.R.#:    [id number]     Account #:     [id number]  
Admission:    18    Attend Phys:   LUIS Choudhary
Discharge:                Date of Birth: [birth year]  
Date of Service: 18 1509  Report #:      [id number]
        [id number]
_______________________________________________________________________________
 
Mitral Valve
MV Mean Gr.:  9.79 mmHg  E/A Ratio:  1.37
    MV Decel. Time:  224.32 ms
MV E Max Jos.:  2.00 m/s 
MV PHT:  65.05 ms  
MVA (PHT):  3.38 cm2  
 
TDI
E/Lateral E':  22.22 E/Medial E':  22.22
   Medial E' Jos.:  0.09 m/s
   Lateral E' Jos.:  0.09 m/s
 
Pulmonary Valve
PV Peak Jos.:  1.20 m/s PV Peak Gr.:  5.80 mmHg
 
Tricuspid Valve
    RAP Estimate:  5.00 mmHg
TR Peak Gr.:  42.44 mmHg RVSP:  47.44 mmHg
    PA Pressure:  47.44 mmHg
 
Left Ventricle
The left ventricle is normal size. There is normal LV segmental wall 
motion. There is normal left ventricular wall thickness. Left 
ventricular systolic function is normal. The left ventricular 
ejection fraction is within the normal range. LVEF is 65%. The left 
ventricular diastolic function is normal.
 
Right Ventricle
The right ventricle is normal size. The right ventricular systolic 
function is normal.
 
Atria
Left atrium is moderately dilated. The right atrium size is 
normal.
 
Aortic Valve
Mild aortic valve sclerosis. Mild aortic regurgitation. Mild aortic 
stenosis.
 
Mitral Valve
Severe mitral annular calcification. Mild mitral regurgitation. No 
evidence of mitral valve stenosis.
 
Tricuspid Valve
The tricuspid valve is normal in structure. Mild tricuspid 
 
 
[place] [address]
Phone:  [phone]                     2 D/M-MODE ECHOCARDIOGRAM     
_______________________________________________________________________________
 
Name:         NAIMA ARIZMENDI            Room:          [address]#:    [id number]     Account #:     [id number]  
Admission:    18    Attend Phys:   LUIS Choudhary
Discharge:                Date of Birth: [birth year]  
Date of Service: 18 1509  Report #:      [id number]
        [id number]
_______________________________________________________________________________
regurgitation. Moderate pulmonary hypertension.
 
Pulmonic Valve
The pulmonary valve is normal in structure. There is no pulmonic 
valvular regurgitation.
 
Great Vessels
The aortic root is normal in size. IVC is normal in size and 
collapses with &gt;50% inspiration
 
Pericardium
There is no pericardial effusion.
 
&lt;Conclusion&gt;
The left ventricle is normal size.
There is normal left ventricular wall thickness.
Left ventricular systolic function is normal.
The left ventricular ejection fraction is within the normal 
range.
LVEF is 65%.
The left ventricular diastolic function is normal.
The right ventricle is normal size.
Left atrium is moderately dilated.
Mild aortic valve sclerosis.
Mild aortic regurgitation.
Mild aortic stenosis.
Severe mitral annular calcification.
Mild mitral regurgitation.
No evidence of mitral valve stenosis.
The tricuspid valve is normal in structure.
Mild tricuspid regurgitation.
Moderate pulmonary hypertension.
IVC is normal in size and collapses with &gt;50% inspiration
There is no pericardial effusion.
There is normal LV segmental wall motion.
 
 
 
 
 
 
 
 
 
  <ELECTRONICALLY SIGNED>
                                           By: Ivan Soto MD, FACC     
  18     1509
D: 18 1509   _____________________________________
T: 18 1509   Ivan Soto MD, FACC       /INF

## 2018-07-26 NOTE — NUR
RECEIVED REPORT AND ASSUMED CARE OF PT, ASSESSMENT COMPLETED. FAMILY AT
BEDSIDE. PT STATES SHE IS HAVING SOME CHEST SORENESS. DISCUSSED HOW VESSELS
ARE OPEN DUE TO STENT PLACED AND COULD BE CAUSING STRETCHING PAIN. PT DENIES
ACTUAL CARDIAC TYPE PAIN. TELEMETRY ON SHOWING SR WITH VERY FREQ PAC VS A-FIB.
RT GROIN NOTED TO HAVE LG HEMATOMA, ECCHYMOTIC AREA, SOFT TO TOUCH, DRSG DRY
AND INTACT.  PT VERY TEARFUL, REASSURANCE GIVEN. WILL CONT TO MONITOR AND
ASSIST AS NEEDED.

## 2018-07-26 NOTE — NUR
PATIENT TRANSFERED TO TELE ROOM 201. REPORT GIVEN. VSS. ASSESSMENT AS CHARTED.
PT UP TO CHAIR WITHOUT COMPLAINTS. CLEAR LIQUID AND HEART  HEALTHY DIET BOTH
TOLERATED WITHOUT DIFFICULTY. PAIN MINIMAL TO RIGHT GROIN. HEMATOMA SOFT,
ECCHYMOSIS TO THE RIGHT GROIN CATH SITE. PULSES 2/2. NO OTHER COMPLAINTS
THROUGHOUT THE DAY.

## 2018-07-26 NOTE — NUR
SPOKE WITH PT. SHE LIVES WITH HER , HAS BEEN ACTIVE AND INDEP. PT HAS
SUPPORTIVE FAMILY. PT DENIES ANY DISCHARGE NEEDS. DISCUSSED ROLE OF CASE MGT,
WILL CONTINUE TO FOLLOW.

## 2018-07-26 NOTE — EKG
[place] [address]
Phone:  [phone]                     ELECTROCARDIOGRAM REPORT      
_______________________________________________________________________________
 
Name:       NAIMA ARIZMENDI             Room:           [address] [place]  
[facility]#:  [id number]      Account #:      [id number]  
Admission:  18     Attend Phys:    Ivan Soto MD, 
Discharge:               Date of Birth:  [birth year]  
         Report #: [id number]
    [id number]
_______________________________________________________________________________
THIS REPORT FOR:  //name//                      
 
                          [facility]
                                       
Test Date:    2018               Test Time:    09:37:36
Pat Name:     NAIMA ARIZMENDI         Department:   
Patient ID:   [id number]            Room:          
Gender:       F                        Technician:   
:          1945               Requested By: Ivan Soto
Order Number: [id number]    Reading MD:   Ivan Soto
                                 Measurements
Intervals                              Axis          
Rate:         80                       P:            65
AZ:           189                      QRS:          32
QRSD:         100                      T:            -55
QT:           395                                    
QTc:          456                                    
                           Interpretive Statements
Sinus rhythm
Atrial premature complexes
Low voltage, precordial leads
Borderline repolarization abnormality
Compared to ECG 2018 16:18:18
ST (T wave) deviation less pronounced
Electronically Signed On 2018 15:23:04 CDT by Ivan Soto
[email or web address]
 
 
 
 
 
 
 
 
 
 
 
 
 
 
 
 
 
  <ELECTRONICALLY SIGNED>
                                           By: Ivan Soto MD, [facility]     
  18     1523
D: 18 0937   _____________________________________
T: 18 0937   Ivan Soto MD, [facility]       /EPI

## 2018-07-26 NOTE — NUR
DR BECKER CALLED CHECKING ON PT, INFORMED OF TEARFUL AND ANXIOUS. ORDER
RECEIVED WITH XANAX GIVEN. PT MUCH CALMER NOW AND SLEEPING. DENIES CHEST
ACHING.

## 2018-07-26 NOTE — NUR
PT ARRIVED TO ROOM 201 AT APPROX 1440 FROM ICU. PT ORIENTED TO ROOM AND STAFF,
THIS RN HAS REVIEWED AND AGREES WITH AM AIXA. PT DID C/O ALL OVER
ARTHRITIS PAIN, TYLENOL 3 GIVEN WITH RELIEF. RIGHT GROIN BRUISED WITH
ECCHYMOSIS, IT IS SOFT, SLIGHLY TENDER TO THEY TOUCH. PT UP WITH ASSIST TO
BATHROOM USES CALL LIGHT APPROPRIATLY. WILL CONTINUE TO MONITOR.

## 2018-07-27 VITALS — DIASTOLIC BLOOD PRESSURE: 43 MMHG | SYSTOLIC BLOOD PRESSURE: 115 MMHG

## 2018-07-27 VITALS — SYSTOLIC BLOOD PRESSURE: 116 MMHG | DIASTOLIC BLOOD PRESSURE: 47 MMHG

## 2018-07-27 VITALS — DIASTOLIC BLOOD PRESSURE: 49 MMHG | SYSTOLIC BLOOD PRESSURE: 139 MMHG

## 2018-07-27 VITALS — DIASTOLIC BLOOD PRESSURE: 51 MMHG | SYSTOLIC BLOOD PRESSURE: 116 MMHG

## 2018-07-27 LAB
ANION GAP SERPL CALC-SCNC: 7 MMOL/L (ref 7–16)
BUN SERPL-MCNC: 16 MG/DL (ref 7–18)
CALCIUM SERPL-MCNC: 8 MG/DL (ref 8.5–10.1)
CHLORIDE SERPL-SCNC: 111 MMOL/L (ref 98–107)
CO2 SERPL-SCNC: 22 MMOL/L (ref 21–32)
CREAT SERPL-MCNC: 1 MG/DL (ref 0.6–1.3)
GLUCOSE SERPL-MCNC: 125 MG/DL (ref 70–99)
HCT VFR BLD CALC: 22 % (ref 37–47)
HGB BLD-MCNC: 7.5 GM/DL (ref 12–15)
MCH RBC QN AUTO: 33.3 PG (ref 26–34)
MCHC RBC AUTO-ENTMCNC: 34.2 G/DL (ref 28–37)
MCV RBC: 97.3 FL (ref 80–100)
MPV: 8 FL. (ref 7.2–11.1)
PLATELET COUNT*: 146 THOU/UL (ref 150–400)
POTASSIUM SERPL-SCNC: 3.9 MMOL/L (ref 3.5–5.1)
RBC # BLD AUTO: 2.26 MIL/UL (ref 4.2–5)
RDW-CV: 13.4 % (ref 10.5–14.5)
SODIUM SERPL-SCNC: 140 MMOL/L (ref 136–145)
WBC # BLD AUTO: 5.6 THOU/UL (ref 4–11)

## 2018-07-27 NOTE — NUR
RECEIVED REPORT FROM LAURA AND ASSUMED CARE OF PT @ 0317.PT IS A/O,BP SLIGHTLY
LOW @ 115/43.TRACING SR WITH PACS ON THE MONITOR.LUNG SOUNDS ARE CLEAR.IV
RIGHT AC PATENT WITH NS RUNNING @ 80ML/HR.PT IS CALM AND COOPERATIVE WITH NO
C/O PAIN AT TIME OF ASSESSMENT.PT IS UP WITH ABS TO BATHROOM.PT LEFT RESTING
IN BED WITH CALL LIGHT WITHIN REACH.CATH SITE OBSERVED DURING BEDSIDE
REPORTING RESULTING IN SOFT WITH BRUISING.WILL CONTINUE TO MONITOR.

## 2018-07-27 NOTE — NUR
SLEPT WELL TONIGHT. GAIT STEADY TO AND FROM BR. RT GROIN SOFT WITH HEMATOMA
UNCHANGED. TELEMETRY CONT SHOWING SR WITH FREQ PAC. HS GOALS OF REST AND
SAFETY ACHIEVED. HOURLY ROUNDING OBSERVED.

## 2018-07-27 NOTE — NUR
PT OK FOR DISCHARGE.PAPERWORK COMPLETED AND GIVEN TO PT.SCRIPTS GIVEN WITH
EDUCATION.IV REMOVED.HEART MONITOR REMOVED AND RETURNED TO NURSING STATION.ALL
PERSONAL BELONGINGS PACKED AND TAKEN WITH PT.WHEELED OUT TO PERSONAL VEHICLE
BY NURSING STAFF.

## 2018-07-27 NOTE — CARD
[address]  [address]                    CARDIAC CATH REPORT           
_______________________________________________________________________________
 
Name:       NAIMA ARIZMENDI             Room:           [address] [place]  
[facility]#:  [id number]      Account #:      [id number]  
Admission:  07/25/18     Attend Phys:    Ivan Soto MD, 
Discharge:               Date of Birth:  [birth year]  
         Report #: [id number]
                                                                     [id number]
_______________________________________________________________________________
THIS REPORT FOR:  //name//                      
 
 
--------------- APPROVED REPORT --------------
 
 
Study performed:  07/25/2018 13:20:33
 
Patient Details
Patient Status: Out-Patient                  Room #: 
The patient is a 73 year-old female
 
Event Personnel
Ivan Soto  Cardiologist, Emily Thompson RN Circulator, 
Talib Perkins Haley, Jessica RTR Monitor, Naida Dixon  
Monitor
 
Procedures Performed
Art Access - R femoral artery*, Coronary angiography; atherectomy 
with stenting of the proximalmid LAD; drug-eluting stent deployment 
at a more distal mid LAD site
 
Indication
Positive stress test
 
Risk Factors
Hypercholesterolemia, Hypertension
 
Previous Procedures/Diagnoses
Previous PCI
 
Admission/Lab Medications/Medications given during procedure
Aspirin, Platelet Aff. Inhib., Heparin Unfract.
 
Procedure Narrative
The patient was brought electively to the Cardiac Catheterization 
Laboratory and was prepped and draped in a sterile manner. The right 
femoral was infiltrated with 2% Lidocaine subcutaneous anesthesia. A 
6fr Ultimum Sheath sheath was inserted into the right femoral artery. 
Coronary angiography was performed using coronary diagnostic 
catheters. The left coronary system was accessed and visualized with 
a Diagnostic 6FR jl 4 catheter. The patient tolerated the procedure 
well and there were no complications associated with the procedure. 
 
Intraoperative Conscious Sedation
Sedation start time:  14:12           Case end Time:  
 
 
 
[place] [address]                    CARDIAC CATH REPORT           
_______________________________________________________________________________
 
Name:       HEIDIONNAIMA             Room:           [address]#:  [id number]      Account #:      [id number]  
Admission:  07/25/18     Attend Phys:    Ivan Soto MD, 
Discharge:               Date of Birth:  [birth year]  
         Report #: [id number]
                                                                     [id number]
_______________________________________________________________________________
15:38    
Fentanyl  125 mcg    Versed  6 mg  
 
Fluoro Time:    22.6 minutes    
Dose:     DAP 2676 cGycm2  126 mGy  
Contrast Type and Amount:  Visipaque 520 ml   
 
Coronary Angiography
The patient's coronary anatomy is right dominant. 
 
Diagnostic Cath
Left Main 0% narrowing
LAD  80% heavily calcified proximalmid LAD stenosis with an 80% mid 
LAD narrowing in a more distal portion
Circumflex 30% proximal narrowing
Right Coronary Total occlusion of the midportion of the dominant 
right coronary artery with left-to-right collaterals filling the 
distal right coronary system
 
Left Ventriculography
Left Ventriculography was not performed.     
 
Hemodynamics
The aortic pressure is 109/50 mmHg with a mean of mmHg. 
 
PCI Technique Lesion
Anticoagulation was achieved with Heparin. Patient was preloaded with 
Hep[place] IV [address] units. Percutaneous coronary intervention was 
performed on the mid left anterior descending artery segment. The 
lesion stenosis prior to intervention was 80% with FERNANDO 3 flow. A 7FR 
XBLAD 3.5 Guide Catheter was used to engage the ostium. A IG: 
GeoVantage Flex 300cm Interventional Guidewire was used to cross the 
lesion.
 
STENT DEPLOYMENT
A drug-eluting stent Xience Alpine RX 2.25X12 was inserted and 
inflated up to 10atm for 7seconds. Additional Inflation: 12atm for 
6seconds.
 
Final angiography reveals 0 % stenosis with FERNANDO 3 flow.
 
PCI Technique Lesion 2
Percutaneous Coronary Intervention was performed on the proximalmid 
left anterior descending artery segment; a severely calcified lesion. 
The lesion stenosis prior to intervention was 80% with FERNANDO 3 flow. A 
7FR XBLAD 3.5 Guide Catheter was used to engage the ostium.
 
 
 
[place] [address]                    CARDIAC CATH REPORT           
_______________________________________________________________________________
 
Name:       TERRACATALINA GOSSJOCELYN SMITH             Room:           [address] [place]  
[facility].#:  [id number]      Account #:      [id number]  
Admission:  07/25/18     Attend Phys:    Ivan Soto MD, 
Discharge:               Date of Birth:  [birth year]  
         Report #: [id number]
                                                                     [id number]
_______________________________________________________________________________
 
Balloon Dilation
Atherectomy was performed utilizing orbital atherectomy with 5 runs 
at low speed across the heavily calcified segment of the proximalmid 
left anterior descending coronary artery; atherectomy was followed by 
angioplasty with a 2.75 x 12 NC trek inflated to 14-16 sarah followed 
by deployment of drug-eluting stent
 
Stent Deployment
A drug-eluting stent Xience Alpine RX 2.5X28 was inserted and 
inflated up to 14.00atm for 10seconds. Additional Inflation: 14.00atm 
for 7seconds. Additional Inflation: 17.00atm for 9seconds. 18 SARAH X 8 
SECONDS
 
Final angiography reveals 10 % stenosis with FERNANDO 3 
flow.
 
Conclusion
#1 significant atherosclerotic coronary artery disease characterized 
by the following:
 
A 80% calcified proximalmid LAD stenosis with focal mid LAD 
narrowing of 80% magnitude  distal to this lesion
 
B 30% proximal circumflex narrowing
 
C dominant right heart coronary artery totally occluded in its 
midportion with left-to-right collaterals filling the distal right 
coronary artery
 
#2 successful percutaneous coronary intervention with orbital 
atherectomy followed by angioplasty and stenting of the 80% stenosed 
proximalmid LAD segment with 10% residual  following final stent 
deployment
 
#3 successful percutaneous coronary intervention of the more distal 
mid LAD site with deployment of drug-eluting stent at the site of 80% 
narrowing with 0% residual narrowing and FERNANDO-3 flow the distal 
vessel 
 
Recommendations
Cardiac Risk Reduction Program
Aggressive Medical Therapy
 
Medications Administered
Aspirin (any) 
 
 
 
[place] [address]                    CARDIAC CATH REPORT           
_______________________________________________________________________________
 
Name:       NAIMA ARIZMENDI             Room:           [address]#:  [id number]      Account #:      [id number]  
Admission:  07/25/18     Attend Phys:    Ivan Soto MD, 
Discharge:               Date of Birth:  [birth year]  
         Report #: [id number]
                                                                     [id number]
_______________________________________________________________________________
Prasugrel 
 
Diagnostic Cath Approved by: Ivan Soto MD        Date/Time: 
7/27/18 at 1106 hrs.
 
 
 
 
 
 
 
 
 
 
 
 
 
 
 
 
 
 
 
 
 
 
 
 
 
 
 
 
 
 
 
 
 
 
 
 
 
 
 
 
<ELECTRONICALLY SIGNED>
                                        By:  Ivan Soto MD, [facility]     
07/27/18     1108
D: 07/27/18 1108_______________________________________
T: 07/27/18 1108Ivan Soto MD, FAC        /INF

## 2018-07-28 NOTE — D
[address]  [address]                    DISCHARGE SUMMARY             
_______________________________________________________________________________
 
Name:       NAIMA ARIZMENDI             Room:           [address]  
M..#:  [id number]      Account #:      [id number]  
Admission:  07/25/18     Attend Phys:    Ivan Soto MD, 
Discharge:  07/27/18     Date of Birth:  [birth year]  
         Report #: [id number]
                                                                     [id number]  
_______________________________________________________________________________
THIS REPORT FOR:  //name//                      
 
CC: Ivan UlloaKirk Carreno
 
DATE OF SERVICE:  07/27/2018
 
 
FINAL DISCHARGE DIAGNOSES:
1.  Abnormal nuclear stress test.
2.  Angina.
3.  Coronary artery disease.
4.  Status post atherectomy with stenting of a high-grade proximal-mid left
anterior descending stenosis.
5.  Hypertension.
6.  Hyperlipoproteinemia.
7.  Mild aortic insufficiency.
 
PROCEDURES:  On 07/25/2018 - left heart catheterization, selective coronary
arteriography and atherectomy with stenting of a high-grade proximal-mid LAD
stenosis.
 
HOSPITAL COURSE:  The patient is a 73-year-old female, patient of Dr. Carreno,
with an abnormal stress test.  Recent catheterization revealed a high-grade
proximal-mid heavily calcified stenosis.  It was dilatable, but not stentable by
virtue of the magnitude of calcification.  She was readmitted on 07/25/2018 for
re-catheterization.  This was done and I performed atherectomy with five runs of
an orbital atherectomy device, followed by stenting of the proximal-mid and mid
LAD slightly further down the artery with a total of 2 drug-eluting stents
deployed and 0% residual narrowing with FERNANDO 3 flow of the distal vessel.
 
Post-procedurally, the patient developed a late femoral bleed and required
significant infusion of crystalloid, manual compression and a transitory
low-dose dopamine.  By the next morning, systemic pressure returned to normal
and she was quite comfortable.  Her hemoglobin did fall to 7.5 grams %. 
Electrolytes on the day of discharge were sodium 140, chloride 111, potassium
3.9, BUN 16 and creatinine 1.0.  Hemoglobin 7.5, white blood cell count 5600
with hematocrit of 22.0 and 146,000 platelets.
 
The patient was discharged to home in stable condition on 07/27/2018 on the
following medications:  Allopurinol 200 mg b.i.d., aspirin 81 mg daily,
gemfibrozil 600 mg b.i.d., losartan 100 mg daily, metoprolol succinate 25 mg
b.i.d., prasugrel or Effient 10 mg daily with a 60 mg loading dose, rosuvastatin
10 mg daily, spironolactone 25 mg daily, trazodone 50 mg at bedtime and ferrous
gluconate 300 mg b.i.d. for 1 month.
 
 
 
[place] [address]                    DISCHARGE SUMMARY             
_______________________________________________________________________________
 
Name:       NAIMA ARIZMENDI             Room:           [address] [place]  
[facility].#:  [id number]      Account #:      [id number]  
Admission:  07/25/18     Attend Phys:    Ivan Soto MD, 
Discharge:  07/27/18     Date of Birth:  [birth year]  
         Report #: [id number]
                                                                     [id number]  
_______________________________________________________________________________
 
She is scheduled to return to see me on 08/02/2018 at [facility] office at 10:20
a.m. and a CBC is to be obtained on 08/01/2018.
 
The patient will then return to see Dr. Carreno for continuing care.
 
Thus, the patient is discharged to home in stable condition on 07/27/2018 on the
aforementioned medications with followup as iterated above.
 
 
 
 
 
 
 
 
 
 
 
 
 
 
 
 
 
 
 
 
 
 
 
 
 
 
 
 
 
 
 
 
 
 
 
 
<ELECTRONICALLY SIGNED>
                                        By:  Ivan Soto MD, FACC     
07/28/18     1228
D: 07/27/18 0942_______________________________________
T: 07/27/18 1111Jodebby Soto MD, FACC        /nt